# Patient Record
Sex: FEMALE | Race: WHITE | Employment: UNEMPLOYED | URBAN - METROPOLITAN AREA
[De-identification: names, ages, dates, MRNs, and addresses within clinical notes are randomized per-mention and may not be internally consistent; named-entity substitution may affect disease eponyms.]

---

## 2017-08-28 ENCOUNTER — HOSPITAL ENCOUNTER (EMERGENCY)
Age: 37
Discharge: HOME | End: 2017-08-28
Attending: EMERGENCY MEDICINE
Payer: SELF-PAY

## 2017-08-28 ENCOUNTER — APPOINTMENT (OUTPATIENT)
Dept: CT IMAGING | Age: 37
End: 2017-08-28
Attending: EMERGENCY MEDICINE
Payer: SELF-PAY

## 2017-08-28 ENCOUNTER — APPOINTMENT (OUTPATIENT)
Dept: GENERAL RADIOLOGY | Age: 37
End: 2017-08-28
Attending: EMERGENCY MEDICINE
Payer: SELF-PAY

## 2017-08-28 VITALS
BODY MASS INDEX: 23.32 KG/M2 | DIASTOLIC BLOOD PRESSURE: 61 MMHG | HEART RATE: 100 BPM | TEMPERATURE: 98.4 F | WEIGHT: 140 LBS | RESPIRATION RATE: 16 BRPM | HEIGHT: 65 IN | OXYGEN SATURATION: 98 % | SYSTOLIC BLOOD PRESSURE: 103 MMHG

## 2017-08-28 DIAGNOSIS — G44.89 HEADACHE SYNDROME: Primary | ICD-10-CM

## 2017-08-28 DIAGNOSIS — Z87.898 HISTORY OF FEVER: ICD-10-CM

## 2017-08-28 DIAGNOSIS — R82.71 BACTERIA IN URINE: ICD-10-CM

## 2017-08-28 LAB
ALBUMIN SERPL-MCNC: 3 G/DL (ref 3.4–5)
ALBUMIN/GLOB SERPL: 0.8 {RATIO} (ref 0.8–1.7)
ALP SERPL-CCNC: 276 U/L (ref 45–117)
ALT SERPL-CCNC: 130 U/L (ref 13–56)
ANION GAP SERPL CALC-SCNC: 8 MMOL/L (ref 3–18)
APPEARANCE UR: ABNORMAL
AST SERPL-CCNC: 122 U/L (ref 15–37)
BACTERIA URNS QL MICRO: ABNORMAL /HPF
BASOPHILS # BLD: 0.1 K/UL (ref 0–0.06)
BASOPHILS NFR BLD: 1 % (ref 0–2)
BILIRUB SERPL-MCNC: 0.4 MG/DL (ref 0.2–1)
BILIRUB UR QL: ABNORMAL
BUN SERPL-MCNC: 10 MG/DL (ref 7–18)
BUN/CREAT SERPL: 20 (ref 12–20)
C TRACH RRNA SPEC QL NAA+PROBE: NEGATIVE
CALCIUM SERPL-MCNC: 8 MG/DL (ref 8.5–10.1)
CHLORIDE SERPL-SCNC: 105 MMOL/L (ref 100–108)
CO2 SERPL-SCNC: 25 MMOL/L (ref 21–32)
COLOR UR: ABNORMAL
CREAT SERPL-MCNC: 0.5 MG/DL (ref 0.6–1.3)
DIFFERENTIAL METHOD BLD: ABNORMAL
EOSINOPHIL # BLD: 0.2 K/UL (ref 0–0.4)
EOSINOPHIL NFR BLD: 2 % (ref 0–5)
EPITH CASTS URNS QL MICRO: ABNORMAL /LPF (ref 0–5)
ERYTHROCYTE [DISTWIDTH] IN BLOOD BY AUTOMATED COUNT: 14.4 % (ref 11.6–14.5)
GLOBULIN SER CALC-MCNC: 3.8 G/DL (ref 2–4)
GLUCOSE SERPL-MCNC: 97 MG/DL (ref 74–99)
GLUCOSE UR STRIP.AUTO-MCNC: NEGATIVE MG/DL
HCG UR QL: NEGATIVE
HCT VFR BLD AUTO: 41.2 % (ref 35–45)
HGB BLD-MCNC: 13.9 G/DL (ref 12–16)
HGB UR QL STRIP: NEGATIVE
KETONES UR QL STRIP.AUTO: ABNORMAL MG/DL
LEUKOCYTE ESTERASE UR QL STRIP.AUTO: ABNORMAL
LIPASE SERPL-CCNC: 99 U/L (ref 73–393)
LYMPHOCYTES # BLD: 5.1 K/UL (ref 0.9–3.6)
LYMPHOCYTES NFR BLD: 52 % (ref 21–52)
MCH RBC QN AUTO: 26.4 PG (ref 24–34)
MCHC RBC AUTO-ENTMCNC: 33.7 G/DL (ref 31–37)
MCV RBC AUTO: 78.2 FL (ref 74–97)
MONOCYTES # BLD: 1 K/UL (ref 0.05–1.2)
MONOCYTES NFR BLD: 10 % (ref 3–10)
MUCOUS THREADS URNS QL MICRO: ABNORMAL /LPF
N GONORRHOEA RRNA SPEC QL NAA+PROBE: NEGATIVE
NEUTS SEG # BLD: 3.5 K/UL (ref 1.8–8)
NEUTS SEG NFR BLD: 35 % (ref 40–73)
NITRITE UR QL STRIP.AUTO: NEGATIVE
PH UR STRIP: 7.5 [PH] (ref 5–8)
PLATELET # BLD AUTO: 188 K/UL (ref 135–420)
PMV BLD AUTO: 8.9 FL (ref 9.2–11.8)
POTASSIUM SERPL-SCNC: 3.5 MMOL/L (ref 3.5–5.5)
PROT SERPL-MCNC: 6.8 G/DL (ref 6.4–8.2)
PROT UR STRIP-MCNC: 30 MG/DL
RBC # BLD AUTO: 5.27 M/UL (ref 4.2–5.3)
RBC #/AREA URNS HPF: ABNORMAL /HPF (ref 0–5)
RBC MORPH BLD: ABNORMAL
SERVICE CMNT-IMP: NORMAL
SODIUM SERPL-SCNC: 138 MMOL/L (ref 136–145)
SP GR UR REFRACTOMETRY: 1.02 (ref 1–1.03)
SPECIMEN SOURCE: NORMAL
UROBILINOGEN UR QL STRIP.AUTO: 1 EU/DL (ref 0.2–1)
WBC # BLD AUTO: 9.9 K/UL (ref 4.6–13.2)
WBC MORPH BLD: ABNORMAL
WBC URNS QL MICRO: ABNORMAL /HPF (ref 0–4)
WET PREP GENITAL: NORMAL

## 2017-08-28 PROCEDURE — 81025 URINE PREGNANCY TEST: CPT | Performed by: EMERGENCY MEDICINE

## 2017-08-28 PROCEDURE — 70450 CT HEAD/BRAIN W/O DYE: CPT

## 2017-08-28 PROCEDURE — 87210 SMEAR WET MOUNT SALINE/INK: CPT | Performed by: NURSE PRACTITIONER

## 2017-08-28 PROCEDURE — 85025 COMPLETE CBC W/AUTO DIFF WBC: CPT | Performed by: EMERGENCY MEDICINE

## 2017-08-28 PROCEDURE — 96374 THER/PROPH/DIAG INJ IV PUSH: CPT

## 2017-08-28 PROCEDURE — 96361 HYDRATE IV INFUSION ADD-ON: CPT

## 2017-08-28 PROCEDURE — 87491 CHLMYD TRACH DNA AMP PROBE: CPT | Performed by: NURSE PRACTITIONER

## 2017-08-28 PROCEDURE — 74011250636 HC RX REV CODE- 250/636: Performed by: NURSE PRACTITIONER

## 2017-08-28 PROCEDURE — 74011000250 HC RX REV CODE- 250: Performed by: EMERGENCY MEDICINE

## 2017-08-28 PROCEDURE — 71020 XR CHEST PA LAT: CPT

## 2017-08-28 PROCEDURE — 80053 COMPREHEN METABOLIC PANEL: CPT | Performed by: EMERGENCY MEDICINE

## 2017-08-28 PROCEDURE — 74011250636 HC RX REV CODE- 250/636: Performed by: EMERGENCY MEDICINE

## 2017-08-28 PROCEDURE — 81001 URINALYSIS AUTO W/SCOPE: CPT | Performed by: EMERGENCY MEDICINE

## 2017-08-28 PROCEDURE — 96375 TX/PRO/DX INJ NEW DRUG ADDON: CPT

## 2017-08-28 PROCEDURE — 99285 EMERGENCY DEPT VISIT HI MDM: CPT

## 2017-08-28 PROCEDURE — 83690 ASSAY OF LIPASE: CPT | Performed by: EMERGENCY MEDICINE

## 2017-08-28 RX ORDER — METOCLOPRAMIDE HYDROCHLORIDE 5 MG/ML
10 INJECTION INTRAMUSCULAR; INTRAVENOUS
Status: COMPLETED | OUTPATIENT
Start: 2017-08-28 | End: 2017-08-28

## 2017-08-28 RX ORDER — KETOROLAC TROMETHAMINE 30 MG/ML
30 INJECTION, SOLUTION INTRAMUSCULAR; INTRAVENOUS
Status: COMPLETED | OUTPATIENT
Start: 2017-08-28 | End: 2017-08-28

## 2017-08-28 RX ORDER — FAMOTIDINE 10 MG/ML
20 INJECTION INTRAVENOUS
Status: COMPLETED | OUTPATIENT
Start: 2017-08-28 | End: 2017-08-28

## 2017-08-28 RX ORDER — DIPHENHYDRAMINE HYDROCHLORIDE 50 MG/ML
25 INJECTION, SOLUTION INTRAMUSCULAR; INTRAVENOUS
Status: COMPLETED | OUTPATIENT
Start: 2017-08-28 | End: 2017-08-28

## 2017-08-28 RX ORDER — SULFAMETHOXAZOLE AND TRIMETHOPRIM 800; 160 MG/1; MG/1
1 TABLET ORAL 2 TIMES DAILY
Qty: 14 TAB | Refills: 0 | Status: SHIPPED | OUTPATIENT
Start: 2017-08-28 | End: 2021-05-28

## 2017-08-28 RX ORDER — IBUPROFEN 800 MG/1
800 TABLET ORAL
Qty: 20 TAB | Refills: 0 | Status: SHIPPED | OUTPATIENT
Start: 2017-08-28 | End: 2017-09-04

## 2017-08-28 RX ADMIN — KETOROLAC TROMETHAMINE 30 MG: 30 INJECTION, SOLUTION INTRAMUSCULAR at 13:07

## 2017-08-28 RX ADMIN — SODIUM CHLORIDE 1000 ML: 900 INJECTION, SOLUTION INTRAVENOUS at 13:03

## 2017-08-28 RX ADMIN — DIPHENHYDRAMINE HYDROCHLORIDE 25 MG: 50 INJECTION, SOLUTION INTRAMUSCULAR; INTRAVENOUS at 13:09

## 2017-08-28 RX ADMIN — METOCLOPRAMIDE 10 MG: 5 INJECTION, SOLUTION INTRAMUSCULAR; INTRAVENOUS at 13:08

## 2017-08-28 RX ADMIN — FAMOTIDINE 20 MG: 10 INJECTION, SOLUTION INTRAVENOUS at 13:04

## 2017-08-28 NOTE — ED PROVIDER NOTES
HPI Comments: Fatemeh Mills is as 39year old female who presents to the ED with a variety of symptoms, but the main underlying problem seems to be a fever. Pt states she has a 101 temp last evening, and has been running a fever on and off for the past two weeks. She has had a headache, and vaginal spotting as well. She is working with GI at [facility] and recently had an upper GI by Dr Faiza Bowers. Admits to a Hx of fibroids              Patient is a 39 y.o. female presenting with fever, abdominal pain, dizziness, and nausea. The history is provided by the patient. Fever    This is a new problem. Episode onset: Pt stats the symptoms began two weeks ago. Abdominal Pain    Associated symptoms include a fever and nausea. Dizziness   Associated symptoms include nausea. Nausea    Associated symptoms include a fever and abdominal pain. 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 No pertinent family history. Social History     Social History    Marital status: LEGALLY      Spouse name: N/A    Number of children: N/A    Years of education: N/A     Occupational History    Not on file. Social History Main Topics    Smoking status: Current Every Day Smoker     Packs/day: 1.00    Smokeless tobacco: Never Used    Alcohol use No    Drug use: No    Sexual activity: Not on file     Other Topics Concern    Not on file     Social History Narrative    No narrative on file         ALLERGIES: Review of patient's allergies indicates no known allergies. Review of Systems   Constitutional: Positive for fever. HENT: Negative. Eyes: Negative. Respiratory: Negative. Cardiovascular: Negative.     Gastrointestinal: Positive for abdominal pain and nausea. Endocrine: Negative. Genitourinary:        Vaginal spotting     Musculoskeletal: Negative. Skin: Negative. Allergic/Immunologic: Negative. Neurological: Positive for dizziness. Psychiatric/Behavioral: Negative. Vitals:    08/28/17 1154   BP: (!) 148/101   Pulse: 100   Resp: 16   Temp: 98.4 °F (36.9 °C)   SpO2: 99%   Weight: 63.5 kg (140 lb)   Height: 5' 5\" (1.651 m)            Physical Exam   Constitutional: She is oriented to person, place, and time. She appears well-developed and well-nourished. No distress. HENT:   Head: Normocephalic and atraumatic. Eyes: EOM are normal. Pupils are equal, round, and reactive to light. Neck: Normal range of motion. Neck supple. Cardiovascular: Normal rate, regular rhythm and normal heart sounds. Pulmonary/Chest: Effort normal and breath sounds normal. No respiratory distress. She has no wheezes. She has no rales. Abdominal: Soft. Bowel sounds are normal. There is no tenderness. There is no rebound and no guarding. Genitourinary:   Genitourinary Comments: See procedure     Musculoskeletal: Normal range of motion. Neurological: She is alert and oriented to person, place, and time. No cranial nerve deficit. She exhibits normal muscle tone. Coordination normal.   Skin: Skin is warm and dry. Psychiatric: She has a normal mood and affect. Nursing note and vitals reviewed. MDM  Number of Diagnoses or Management Options  Bacteria in urine:   Headache syndrome:   History of fever:   Diagnosis management comments: PROGRESS NOTE:  No root cause of fever found. Will place the Pt on short course of ABX for bacteria in urine. Will DC to home. Kathy Mejia NP  3:14 PM           Amount and/or Complexity of Data Reviewed  Clinical lab tests: ordered  Tests in the radiology section of CPT®: ordered and reviewed      ED Course       Pelvic Exam  Date/Time: 8/28/2017 1:20 PM  Performed by: Adolfo BORJAC.   Procedure duration (minutes): 2. Documented by: Niall DIAZ. Chaperone: Nathaniel Benites. Type of exam performed: bimanual and speculum. External genitalia appearance: normal.    Vaginal exam:  normal (No evidece of bleeding noted. ). Cervical exam:  normal and no cervical motion tenderness. Specimen(s) collected:  chlamydia, GC and vaginal culture. Bimanual exam:  normal.            Diagnosis:   1. Headache syndrome    2. History of fever    3. Bacteria in urine          Disposition:   Discharged to Home. Follow-up Information     Follow up With Details Comments [address] [facility] [address], [place] Call in the morning to [facility] follow up   [address] [facility]  [address] [facility]  [phone]            Patient's Medications   Start Taking    IBUPROFEN (MOTRIN) 800 MG TABLET    Take 1 Tab by mouth every eight (8) hours as needed for Pain for up to 7 days. TRIMETHOPRIM-SULFAMETHOXAZOLE (BACTRIM DS, SEPTRA DS) 160-800 MG PER TABLET    Take 1 Tab by mouth two (2) times a day.    Continue Taking    No medications on file   These Medications have changed    No medications on file   Stop Taking    No medications on file

## 2017-08-28 NOTE — DISCHARGE INSTRUCTIONS
CME Activation    Thank you for requesting access to CME. Please follow the instructions below to securely access and download your online medical record. CME allows you to send messages to your doctor, view your test results, renew your prescriptions, schedule appointments, and more. How Do I Sign Up? 1. In your internet browser, go to [email or web address]  2. Click on the First Time User? Click Here link in the Sign In box. You will be redirect to the New Member Sign Up page. 3. Enter your CME Access Code exactly as it appears below. You will not need to use this code after youve completed the sign-up process. If you do not sign up before the expiration date, you must request a new code. CME Access Code: [email or web address]  Expires: 2017  3:17 PM (This is the date your CME access code will )    4. Enter the last four digits of your Social Security Number (xxxx) and Date of Birth (mm/dd/yyyy) as indicated and click Submit. You will be taken to the next sign-up page. 5. Create a CME ID. This will be your CME login ID and cannot be changed, so think of one that is secure and easy to remember. 6. Create a CME password. You can change your password at any time. 7. Enter your Password Reset Question and Answer. This can be used at a later time if you forget your password. 8. Enter your e-mail address. You will receive e-mail notification when new information is available in [address] 9. Click Sign Up. You can now view and download portions of your medical record. 10. Click the Download Summary menu link to download a portable copy of your medical information. Additional Information    If you have questions, please visit the Frequently Asked Questions section of the CME website at [email or web address] Remember, CME is NOT to be used for urgent needs. For medical emergencies, dial 911. Keep well hydrated.   Drink 3 liters of water daily. Follow up with your physician or the referral as provided.

## 2017-08-28 NOTE — ED NOTES
Discharging patient for primary nurse [facility] RN. Discharge instructions given to patient, patient verbalizes understanding of instructions. Patient alert and oriented x3, denies pain or shortness of breath at this time. Patient ambulatory, gait steady. Patient armband removed and given to patient to take home. Patient was informed of the privacy risks if armband lost or stolen.

## 2021-05-28 ENCOUNTER — VIRTUAL VISIT (OUTPATIENT)
Dept: FAMILY MEDICINE CLINIC | Age: 41
End: 2021-05-28
Payer: MEDICAID

## 2021-05-28 DIAGNOSIS — F41.9 ANXIETY AND DEPRESSION: ICD-10-CM

## 2021-05-28 DIAGNOSIS — K22.9 ESOPHAGEAL ABNORMALITY: ICD-10-CM

## 2021-05-28 DIAGNOSIS — K51.80 OTHER ULCERATIVE COLITIS WITHOUT COMPLICATION (HCC): ICD-10-CM

## 2021-05-28 DIAGNOSIS — R29.898 WEAKNESS OF BOTH LEGS: Primary | ICD-10-CM

## 2021-05-28 DIAGNOSIS — Q61.5 MEDULLARY SPONGE KIDNEY: ICD-10-CM

## 2021-05-28 DIAGNOSIS — F32.A ANXIETY AND DEPRESSION: ICD-10-CM

## 2021-05-28 DIAGNOSIS — F17.200 SMOKING: ICD-10-CM

## 2021-05-28 PROBLEM — K51.90 ULCERATIVE COLITIS WITHOUT COMPLICATIONS (HCC): Status: ACTIVE | Noted: 2017-08-10

## 2021-05-28 PROBLEM — R87.610 ATYPICAL SQUAMOUS CELL OF UNDETERMINED SIGNIFICANCE OF CERVIX: Status: ACTIVE | Noted: 2017-08-24

## 2021-05-28 PROBLEM — N85.2 ENLARGED UTERUS: Status: ACTIVE | Noted: 2017-08-22

## 2021-05-28 PROCEDURE — 99204 OFFICE O/P NEW MOD 45 MIN: CPT | Performed by: NURSE PRACTITIONER

## 2021-05-28 RX ORDER — CITALOPRAM 40 MG/1
40 TABLET, FILM COATED ORAL DAILY
COMMUNITY
End: 2021-05-28 | Stop reason: SDUPTHER

## 2021-05-28 RX ORDER — CITALOPRAM 40 MG/1
40 TABLET, FILM COATED ORAL DAILY
Qty: 90 TABLET | Refills: 0 | Status: SHIPPED | OUTPATIENT
Start: 2021-05-28 | End: 2021-09-09 | Stop reason: SDUPTHER

## 2021-05-28 RX ORDER — OMEPRAZOLE 40 MG/1
40 CAPSULE, DELAYED RELEASE ORAL DAILY
Qty: 90 CAPSULE | Refills: 0 | Status: SHIPPED | OUTPATIENT
Start: 2021-05-28 | End: 2021-09-09 | Stop reason: SDUPTHER

## 2021-05-28 RX ORDER — ALPRAZOLAM 1 MG/1
TABLET ORAL
COMMUNITY
End: 2021-10-19

## 2021-05-28 NOTE — PROGRESS NOTES
Chief Complaint   Patient presents with    Establish Care    Headache     1. Have you been to the ER, urgent care clinic since your last visit? Hospitalized since your last visit? No    2. Have you seen or consulted any other health care providers outside of the [address] [place] [address] since your last visit? Include any pap smears or colon screening.  No     Health Maintenance Due   Topic Date Due    COVID-19 Vaccine (1) Never done    DTaP/Tdap/Td series (1 - Tdap) Never done    PAP AKA CERVICAL CYTOLOGY  Never done    Lipid Screen  Never done

## 2021-05-28 NOTE — PROGRESS NOTES
[address] [place] [address]               [phone]      Darlin Gillespie is a 38 y.o. female who was seen by synchronous (real-time) audio-video technology on 5/28/2021. Consent: Darlin Gillespie, who was seen by synchronous (real-time) audio-video technology, and/or her healthcare decision maker, is aware that this patient-initiated, Telehealth encounter on 5/28/2021 is a billable service, with coverage as determined by her insurance carrier. She is aware that she may receive a bill and has provided verbal consent to proceed: Yes. Assessment & Plan:   Diagnoses and all orders for this visit:    1. Weakness of both legs  Endorses a problem of sudden onset leg weakness where she feels like her legs will give out from underneath her accompanied by lower back pain, she has not had any actual falls  We will have her come into the office for further evaluation, she has an appointment for this Tuesday at 10 AM  2. Medullary sponge kidney  -     REFERRAL TO UROLOGY  Past history of kidney disease, referral to urology for further evaluation and management  3. Other ulcerative colitis without complication ([facility] [address]  -     REFERRAL TO GASTROENTEROLOGY  Past history of ulcerative colitis, referral to gastroenterology for further evaluation and management  4. Esophageal abnormality  -     omeprazole (PRILOSEC) 40 mg capsule; Take 1 Capsule by mouth daily. Refill provided  5. Smoking  Is a current smoker, states every time she tries to quit smoking she seems to have a flare of her colitis, encouraged her to follow through with a follow-up to gastroenterology to see if there is a way to manage the colitis while she stop smoking  6. Anxiety and depression  -     citalopram (CeleXA) 40 mg tablet; Take 1 Tablet by mouth daily.   Will restart her Celexa, cautioned on the potential side effect of suicidal ideations, she verbalized understanding    Follow-up and Dispositions    · Return in about 3 months (around 8/28/2021) for depression, VV, 15 min.              712  Subjective:     Health Maintenance Due   Topic Date Due    Hepatitis C Screening  Never done    Pneumococcal 0-64 years (1 of 2 - PPSV23) Never done    COVID-19 Vaccine (1) Never done    DTaP/Tdap/Td series (1 - Tdap) Never done    PAP AKA CERVICAL CYTOLOGY  Never done    Lipid Screen  Never done             Jimmy Overton is a 38 y.o. female who was seen for   Establish Care and Headache      Depression/anxiety  Last took celexa 2 years ago, was on xanax also  Diagnosed about 20 years ago  Endorses feeling stress, anxiety, overwhelming sensations worsended in the past 4-5 months  Restart celexa: precautions given    Leg weakness : come in Tuesday 10AM  Legs will go numb and feel like they are giving out and she will get a pain in her lower back  Onset: 1 year ago  The problem frequency has increased, 2-3 times a week  Has not fallen  Denies numbness in her legs at other times  The episode last 5-10 minutes  Denies trauma or injury to her back  States no problems in pelvic area,   Endorses urinary frequency, inability to completely empty bladder, has worsened in the past 2 years  PMH: enlarged uterus and abnormal PAP with no f/u      Ulcerative collitis  Couple of weeks ago she has noticed that when she has a bm she gets hot/cold/feels like she will passout/headaches/shaking, the stool is diarrhea with mucous  Denies blood or hemorrhoid pain  Has not been to a GI doctor in awhile, used to go to [facility]      Medullary sponge kidney  Diagnosed about 19 years ago  Has frequent kidney stones  She was told congenital    History of abnormal eophogeal motility  Endorse symptoms consisten with esophageal spasms  Not on PPI or H2  Denies getting sour taste in mouth, worsening of problem when lying down  Problem happens suddenly, happens 10-15 minutes  Tries tums and drinking milk  Endorses nausea without emesis    Smoking  every time she stops her ulcerative colitis flares up  She is trying to quit  Afraid to quit because she does not want the colitis to flare up  Advised to talk with GI first      Prior to Admission medications    Medication Sig Start Date End Date Taking? Authorizing Provider   ALPRAZolam (Xanax) 1 mg tablet Take  by mouth. Yes Provider, Historical   omeprazole (PRILOSEC) 40 mg capsule Take 1 Capsule by mouth daily. 5/28/21  Yes Patricia Hector NP   citalopram (CeleXA) 40 mg tablet Take 1 Tablet by mouth daily. 5/28/21  Yes Patricia Hector NP     No Known Allergies    Patient Active Problem List   Diagnosis Code    Atypical squamous cell of undetermined significance of cervix R87.610    Enlarged uterus N85.2    Medullary sponge kidney Q61.5    Smoking F17.200    Ulcerative colitis without complications ([facility] [address] I43.57    Esophageal abnormality K22.9     Past Surgical History:   Procedure Laterality Date    HX LITHOTRIPSY      HX TUBAL LIGATION      LA ABDOMEN SURGERY PROC UNLISTED       History reviewed. No pertinent family history. Social History     Tobacco Use    Smoking status: Current Every Day Smoker     Packs/day: 1.00    Smokeless tobacco: Never Used   Substance Use Topics    Alcohol use: No       ROS  As stated in HPI, otherwise all others negative. Objective: There were no vitals taken for this visit. General: alert, cooperative, no distress   Mental  status: normal mood, behavior, speech, dress, motor activity, and thought processes, able to follow commands   HENT: NCAT   Neck: no visualized mass   Resp: no respiratory distress   Neuro: no gross deficits   Skin: no discoloration or lesions of concern on visible areas   Psychiatric: normal affect, consistent with stated mood, no evidence of hallucinations     Additional exam findings: We discussed the expected course, resolution and complications of the diagnosis(es) in detail.   Medication risks, benefits, costs, interactions, and alternatives were discussed as indicated. I advised her to contact the office if her condition worsens, changes or fails to improve as anticipated. She expressed understanding with the diagnosis(es) and plan. Makayla Le is a 38 y.o. female who was evaluated by a video visit encounter for concerns as above. Patient identification was verified prior to start of the visit. A caregiver was present when appropriate. Due to this being a TeleHealth encounter (During RQTRQ-59 public health emergency), evaluation of the following organ systems was limited: Vitals/Constitutional/EENT/Resp/CV/GI//MS/Neuro/Skin/Heme-Lymph-Imm. Pursuant to the emergency declaration under the [facility] [address] waiver authority and the [employer] Act, this Virtual  Visit was conducted, with patient's (and/or legal guardian's) consent, to reduce the patient's risk of exposure to COVID-19 and provide necessary medical care. Services were provided through a video synchronous discussion virtually to substitute for in-person clinic visit. Patient and provider were located at their individual homes. An After Visit Summary was printed and given to the patient. All diagnosis have been discussed with the patient and all of the patient's questions have been answered. Follow-up and Dispositions    · Return in about 3 months (around 8/28/2021) for depression, VV, 15 min. Jennyfer Parham, [facility]   Miroslava Thomas

## 2021-06-01 ENCOUNTER — OFFICE VISIT (OUTPATIENT)
Dept: FAMILY MEDICINE CLINIC | Age: 41
End: 2021-06-01
Payer: MEDICAID

## 2021-06-01 VITALS
WEIGHT: 168.8 LBS | OXYGEN SATURATION: 98 % | HEART RATE: 74 BPM | SYSTOLIC BLOOD PRESSURE: 107 MMHG | BODY MASS INDEX: 28.09 KG/M2 | DIASTOLIC BLOOD PRESSURE: 71 MMHG | TEMPERATURE: 97.9 F

## 2021-06-01 DIAGNOSIS — R29.898 WEAKNESS OF BOTH LEGS: Primary | ICD-10-CM

## 2021-06-01 DIAGNOSIS — R10.12 LUQ PAIN: ICD-10-CM

## 2021-06-01 DIAGNOSIS — R87.610 ATYPICAL SQUAMOUS CELL OF UNDETERMINED SIGNIFICANCE OF CERVIX: ICD-10-CM

## 2021-06-01 PROBLEM — N85.2 ENLARGED UTERUS: Status: RESOLVED | Noted: 2017-08-22 | Resolved: 2021-06-01

## 2021-06-01 PROCEDURE — 99214 OFFICE O/P EST MOD 30 MIN: CPT | Performed by: NURSE PRACTITIONER

## 2021-06-01 NOTE — PROGRESS NOTES
Chief Complaint   Patient presents with    Follow-up    Back Pain    Spasms     1. Have you been to the ER, urgent care clinic since your last visit? Hospitalized since your last visit? No    2. Have you seen or consulted any other health care providers outside of the [address] [place] [address] since your last visit? Include any pap smears or colon screening.  No     Health Maintenance Due   Topic Date Due    Hepatitis C Screening  Never done    Pneumococcal 0-64 years (1 of 2 - PPSV23) Never done    COVID-19 Vaccine (1) Never done    DTaP/Tdap/Td series (1 - Tdap) Never done    PAP AKA CERVICAL CYTOLOGY  Never done    Lipid Screen  Never done

## 2021-06-01 NOTE — PROGRESS NOTES
[address] [place] [address]               [phone]      Artur Maharaj is a 38 y.o. female and presents with Follow-up, Back Pain, and Spasms       Assessment/Plan:    Diagnoses and all orders for this visit:    1. Weakness of both legs  -     REFERRAL TO NEUROLOGY  No significant findings on her physical exam to explain her leg weakness, states the leg weakness starts in addition to numbness prior to feeling any back pain, will refer to neurology for further evaluation  2. LUQ pain  Endorses a tenderness to the left upper quadrant of her abdomen, advised her to discuss this with the gastroenterology when she goes to see them, no significant findings on her physical exam  3. Atypical squamous cell of undetermined significance of cervix  -     REFERRAL TO GYNECOLOGY  Referral to gynecology to follow-up on her abnormal Pap smears      Follow-up and Dispositions    · Return if symptoms worsen or fail to improve. Subjective: Had VV on 5/28 she endorses problems with leg weakness:  Legs will go numb and feel like they are giving out and she will get a pain in her lower back  Onset: 1 year ago  The problem frequency has increased, 2-3 times a week  Has not fallen  Denies numbness in her legs at other times  The episode last 5-10 minutes  Denies trauma or injury to her back  States no problems in pelvic area,   Endorses urinary frequency, inability to completely empty bladder, has worsened in the past 2 years  PMH: enlarged uterus and abnormal PAP with no f/u     Visit today  Confirms the above information  Also endroses a pain in her RUQ, the problem is there if she is sitting only, feels like something is getting pinched, this started about 1 month ago, sitting straight makes it better, eating and drinking does not affect the pain, just body position, does not recall any trauma or injury in the area      ROS:     ROS  As stated in HPI, otherwise all others negative. The problem list was updated as a part of today's visit. Patient Active Problem List   Diagnosis Code    Atypical squamous cell of undetermined significance of cervix R87.610    Medullary sponge kidney Q61.5    Smoking F17.200    [facility] H15.50    Esophageal abnormality K22.9       The PSH, FH were reviewed. SH:  Social History     Tobacco Use    Smoking status: Current Every Day Smoker     Packs/day: 1.00    Smokeless tobacco: Never Used   Substance Use Topics    Alcohol use: No    Drug use: No       Medications/Allergies:  Current Outpatient Medications on File Prior to Visit   Medication Sig Dispense Refill    omeprazole (PRILOSEC) 40 mg capsule Take 1 Capsule by mouth daily. 90 Capsule 0    citalopram (CeleXA) 40 mg tablet Take 1 Tablet by mouth daily. 90 Tablet 0    ALPRAZolam (Xanax) 1 mg tablet Take  by mouth. (Patient not taking: Reported on 6/1/2021)       No current facility-administered medications on file prior to visit. No Known Allergies    Objective:  Visit Vitals  /71   Pulse 74   Temp 97.9 °F (36.6 °C) (Temporal)   Wt 168 lb 12.8 oz (76.6 kg)   SpO2 98%   BMI 28.09 kg/m²    Body mass index is 28.09 kg/m². Physical assessment  Physical Exam  Vitals and nursing note reviewed. Eyes:      Conjunctiva/sclera: Conjunctivae normal.      Pupils: Pupils are equal, round, and reactive to light. Neck:      Vascular: No JVD. Cardiovascular:      Rate and Rhythm: Normal rate and regular rhythm. Pulses:           Posterior tibial pulses are 2+ on the right side and 2+ on the left side. Heart sounds: Normal heart sounds. No murmur heard. No friction rub. No gallop. Pulmonary:      Effort: Pulmonary effort is normal.      Breath sounds: Normal breath sounds. Abdominal:      General: Bowel sounds are normal.      Palpations: Abdomen is soft. Tenderness: There is abdominal tenderness in the left upper quadrant. Musculoskeletal:         General: Normal range of motion. Cervical back: Normal range of motion. Feet:      Right foot:      Protective Sensation: 6 sites tested. 6 sites sensed. Skin integrity: Skin integrity normal.      Left foot:      Protective Sensation: 6 sites tested. 6 sites sensed. Skin integrity: Skin integrity normal.   Skin:     General: Skin is warm and dry. Neurological:      Mental Status: She is alert and oriented to person, place, and time. Motor: No weakness. Deep Tendon Reflexes:      Reflex Scores:       Patellar reflexes are 3+ on the right side and 3+ on the left side. Achilles reflexes are 3+ on the right side and 3+ on the left side.   Psychiatric:         Mood and Affect: Affect normal.         Cognition and Memory: Memory normal.         Judgment: Judgment normal.           Labwork and Ancillary Studies:    CBC w/Diff  Lab Results   Component Value Date/Time    WBC 9.9 08/28/2017 12:05 PM    HGB 13.9 08/28/2017 12:05 PM    PLATELET 067 50/57/2137 12:05 PM         Basic Metabolic Profile  Lab Results   Component Value Date/Time    Sodium 138 08/28/2017 12:05 PM    Potassium 3.5 08/28/2017 12:05 PM    Chloride 105 08/28/2017 12:05 PM    CO2 25 08/28/2017 12:05 PM    Anion gap 8 08/28/2017 12:05 PM    Glucose 97 08/28/2017 12:05 PM    BUN 10 08/28/2017 12:05 PM    Creatinine 0.50 (L) 08/28/2017 12:05 PM    BUN/Creatinine ratio 20 08/28/2017 12:05 PM    GFR est AA >60 08/28/2017 12:05 PM    GFR est non-AA >60 08/28/2017 12:05 PM    Calcium 8.0 (L) 08/28/2017 12:05 PM        Cholesterol  No results found for: CHOL, CHOLX, CHLST, CHOLV, HDL, HDLP, LDL, LDLC, DLDLP, TGLX, TRIGL, TRIGP, CHHD, CHHDX    Health Maintenance:   Health Maintenance   Topic Date Due    Hepatitis C Screening  Never done    Pneumococcal 0-64 years (1 of 2 - PPSV23) Never done    COVID-19 Vaccine (1) Never done    DTaP/Tdap/Td series (1 - Tdap) Never done    PAP AKA CERVICAL CYTOLOGY  Never done    Lipid Screen  Never done    Flu Vaccine (Season Ended) 09/01/2021       I have discussed the diagnosis with the patient and the intended plan as seen in the above orders. The patient has received an After-Visit Summary and questions were answered concerning future plans. An After Visit Summary was printed and given to the patient. All diagnosis have been discussed with the patient and all of the patient's questions have been answered. Follow-up and Dispositions    · Return if symptoms worsen or fail to improve. Atif Brink, WALKER-BC  [address] [facility]   [address] [facility] [address]  [address]

## 2021-09-09 DIAGNOSIS — F32.A ANXIETY AND DEPRESSION: ICD-10-CM

## 2021-09-09 DIAGNOSIS — K22.9 ESOPHAGEAL ABNORMALITY: ICD-10-CM

## 2021-09-09 DIAGNOSIS — F41.9 ANXIETY AND DEPRESSION: ICD-10-CM

## 2021-09-12 RX ORDER — OMEPRAZOLE 40 MG/1
40 CAPSULE, DELAYED RELEASE ORAL DAILY
Qty: 90 CAPSULE | Refills: 0 | Status: ON HOLD | OUTPATIENT
Start: 2021-09-12 | End: 2021-12-15

## 2021-09-12 RX ORDER — CITALOPRAM 40 MG/1
40 TABLET, FILM COATED ORAL DAILY
Qty: 90 TABLET | Refills: 0 | Status: SHIPPED | OUTPATIENT
Start: 2021-09-12 | End: 2021-11-18 | Stop reason: SDUPTHER

## 2021-10-19 ENCOUNTER — OFFICE VISIT (OUTPATIENT)
Dept: NEUROLOGY | Age: 41
End: 2021-10-19
Payer: MEDICAID

## 2021-10-19 VITALS
DIASTOLIC BLOOD PRESSURE: 68 MMHG | SYSTOLIC BLOOD PRESSURE: 102 MMHG | BODY MASS INDEX: 29.46 KG/M2 | HEIGHT: 65 IN | HEART RATE: 85 BPM | WEIGHT: 176.8 LBS | RESPIRATION RATE: 22 BRPM | OXYGEN SATURATION: 97 %

## 2021-10-19 DIAGNOSIS — G89.29 CHRONIC MIDLINE LOW BACK PAIN, UNSPECIFIED WHETHER SCIATICA PRESENT: Primary | ICD-10-CM

## 2021-10-19 DIAGNOSIS — M54.50 CHRONIC MIDLINE LOW BACK PAIN, UNSPECIFIED WHETHER SCIATICA PRESENT: Primary | ICD-10-CM

## 2021-10-19 DIAGNOSIS — R29.898 WEAKNESS OF BOTH LEGS: ICD-10-CM

## 2021-10-19 PROCEDURE — 99204 OFFICE O/P NEW MOD 45 MIN: CPT | Performed by: STUDENT IN AN ORGANIZED HEALTH CARE EDUCATION/TRAINING PROGRAM

## 2021-10-19 NOTE — PROGRESS NOTES
Lamont Hyde is a 38 y.o. female . presents for New Patient Viktor Distance, NP) and Extremity Weakness (BLE)   . A 38years old female patient here for evaluation of lower back pain and weakness of her lower extremities of about 8 months duration. She has intermittent weakness of her lower extremities, i.e. her legs giving out when she is walking. She has to sit out. Might be followed by severe/excruciating pain over her lower back which lasted for about 2 minutes. Pain is nonradiating. Has occasional cramp over her hip on the right side. Pain is unpredictable. No falls. No weakness of her upper extremities. She has urge incontinence; following with urology. No bowel incontinence. She has occasional hyper neck pain with occipital headache; intermittent. Nonradiating. Denied having any history of trauma. Used to work as a ; but not working over the past 1 and half years. Used to lift heavy objects while at work. Currently smokes 1 pack/day; trying to stop. Past history of ulcerative colitis. Also has psoriasis; mainly involving the skin. No previous diagnosis of arthritis or sacroiliitis. Has tingling and numbness in her legs. Review of Systems   Constitutional: Negative for chills, fever and weight loss. HENT: Positive for tinnitus. Negative for hearing loss. Eyes: Negative for blurred vision and double vision. Respiratory: Negative for cough and shortness of breath. Cardiovascular: Negative for chest pain and leg swelling. Gastrointestinal: Negative for nausea and vomiting. Genitourinary: Positive for frequency and urgency. Negative for dysuria. Musculoskeletal: Positive for back pain and neck pain. Skin: Negative for itching and rash. Neurological: Positive for dizziness (sometimes), tingling (in legs), sensory change, focal weakness (intermittent LE weakness) and headaches (occipital area). Negative for tremors.    Endo/Heme/Allergies: Does not bruise/bleed easily. Psychiatric/Behavioral: Positive for depression. Negative for suicidal ideas. The patient is nervous/anxious. 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 Social History     Socioeconomic History    Marital status:      Spouse name: Not on file    Number of children: Not on file    Years of education: Not on file    Highest education level: Not on file   Occupational History    Not on file   Tobacco Use    Smoking status: Current Every Day Smoker     Packs/day: 1.00    Smokeless tobacco: Never Used   Substance and Sexual Activity    Alcohol use: No    Drug use: No    Sexual activity: Not on file   Other Topics Concern    Not on file   Social History Narrative    Not on file     Social Determinants of Health     Financial Resource Strain:     Difficulty of Paying Living Expenses:    Food Insecurity:     Worried About Running Out of Food in the Last Year:     [address] in the Last Year:    Transportation Needs:     Lack of Transportation (Medical):      Lack of Transportation (Non-Medical):    Physical Activity:     Days of Exercise per Week:     Minutes of Exercise per Session:    Stress:     Feeling of Stress :    Social Connections:     Frequency of Communication with Friends and Family:     Frequency of Social Gatherings with Friends and Family:     Attends Gnosticism Services:     Active Member of Clubs or Organizations:     Attends Club or Organization Meetings:     Marital Status:    Intimate Partner Violence:     Fear of Current or Ex-Partner:     Emotionally Abused:     Physically Abused:     Sexually Abused:         No Known Allergies      Current Outpatient Medications Medication Sig Dispense Refill    solifenacin (VESICARE) 5 mg tablet Take 1 Tablet by mouth daily. 90 Tablet 3    citalopram (CeleXA) 40 mg tablet Take 1 Tablet by mouth daily. 90 Tablet 0    omeprazole (PRILOSEC) 40 mg capsule Take 1 Capsule by mouth daily. 90 Capsule 0    ALPRAZolam (Xanax) 1 mg tablet Take  by mouth. Physical Exam  Constitutional:       Appearance: Normal appearance. HENT:      Head: Normocephalic and atraumatic. Mouth/Throat:      Mouth: Mucous membranes are moist.      Pharynx: Oropharynx is clear. No oropharyngeal exudate. Eyes:      Extraocular Movements: Extraocular movements intact. Pupils: Pupils are equal, round, and reactive to light. Pulmonary:      Effort: Pulmonary effort is normal. No respiratory distress. Musculoskeletal:         General: Normal range of motion. Cervical back: Normal range of motion and neck supple. Right lower leg: No edema. Left lower leg: No edema. Comments: Negative straight leg raising test   Neurological:      Mental Status: She is alert. Comments: Mental status: Awake, alert, oriented x3, follows simple and complex commands, no neglect, no extinction to DSS or VSS. Speech and languge: fluent, coherent,  and comprehension intact  CN: VFF, EOMI, PERRLA, face sensation intact , no facial asymmetry noted, palate elevation symmetric bilat, SS+SCM 5/5 bilat, tongue midline  Motor: no pronator drift, tone normal throughout, strength 5/5 throughout  Sensory: intact to light touch and PP throughout  Coordination: FNF, HS accurate w/o dysmetria  DTR: 2+ over the upper extremities; 3+ over the lower extremities; plantar downgoing.   Gait: Normal.            Office Visit on 07/27/2021   Component Date Value Ref Range Status    Color (UA POC) 07/27/2021 Yellow   Final    Clarity (UA POC) 07/27/2021 Clear   Final    Glucose (UA POC) 07/27/2021 Negative  Negative Final    Bilirubin (UA POC) 07/27/2021 Negative Negative Final    Ketones (UA POC) 07/27/2021 Negative  Negative Final    Specific gravity (UA POC) 07/27/2021 1.025  1.001 - 1.035 Final    Blood (UA POC) 07/27/2021 1+  Negative Final    pH (UA POC) 07/27/2021 7.0  4.6 - 8.0 Final    Protein (UA POC) 07/27/2021 Negative  Negative Final    Urobilinogen (UA POC) 07/27/2021 1 mg/dL  0.2 - 1 Final    Nitrites (UA POC) 07/27/2021 Negative  Negative Final    Leukocyte esterase (UA POC) 07/27/2021 Negative  Negative Final    Urine Culture, Routine 07/27/2021    Final                    Value:Lactobacillus species  50,000-100,000 colony forming units per mL      Susceptibility not normally performed on this organism.  Specific Gravity 07/27/2021 1.024  1.005 - 1.030 Final    pH (UA) 07/27/2021 6.5  5.0 - 7.5 Final    Color 07/27/2021 Yellow  Yellow Final    Appearance 07/27/2021 Clear  Clear Final    Leukocyte Esterase 07/27/2021 Negative  Negative Final    Protein 07/27/2021 Trace  Negative/Trace Final    Glucose 07/27/2021 Negative  Negative Final    Ketone 07/27/2021 Trace* Negative Final    Blood 07/27/2021 1+* Negative Final    Bilirubin 07/27/2021 Negative  Negative Final    Urobilinogen 07/27/2021 1.0  0.2 - 1.0 mg/dL Final    Nitrites 07/27/2021 Negative  Negative Final    Microscopic Examination 07/27/2021 See additional order   Final    Microscopic was indicated and was performed.  WBC 07/27/2021 None seen  0 - 5 /hpf Final    RBC 07/27/2021 11-30* 0 - 2 /hpf Final    Epithelial cells 07/27/2021 0-10  0 - 10 /hpf Final    Casts 07/27/2021 None seen  None seen /lpf Final    Bacteria 07/27/2021 None seen  None seen/Few Final             ICD-10-CM ICD-9-CM    1. Chronic midline low back pain, unspecified whether sciatica present  M54.50 724.2 MRI LUMB SPINE WO CONT    G89.29 338.29    2.  Weakness of both legs  R29.898 729.89 MRI LUMB SPINE WO CONT       Patient has lower back pain which is intermittent and associated with intermittent weakness of her lower extremities. No previous history of trauma. Neuro exam at this time is normal.  Since pain has been going on for 8 months and associated with lower extremity weakness, will get MRI of the lumbosacral spine to rule out the possibility of spinal canal stenosis or nerve root compression. We will see her in the clinic in 3 months time but will call her with the results of the MRI.

## 2021-10-19 NOTE — Clinical Note
10/19/2021    Patient: Sophy Staton   YOB: 1980   Date of Visit: 10/19/2021     Naheed Naidu NP  [address] [facility] [address]  [facility] [id number]  [place], [place]  [place]ia     Dear IRLANDA Garner NP,      Thank you for referring Ms. Nathaly Nelson to [facility] for evaluation. My notes for this consultation are attached. If you have questions, please do not hesitate to call me. I look forward to following your patient along with you.       Sincerely,    Domi Guardado MD

## 2021-11-02 DIAGNOSIS — G89.29 CHRONIC MIDLINE LOW BACK PAIN, UNSPECIFIED WHETHER SCIATICA PRESENT: ICD-10-CM

## 2021-11-02 DIAGNOSIS — R29.898 WEAKNESS OF BOTH LEGS: ICD-10-CM

## 2021-11-02 DIAGNOSIS — M54.50 CHRONIC MIDLINE LOW BACK PAIN, UNSPECIFIED WHETHER SCIATICA PRESENT: ICD-10-CM

## 2021-11-09 ENCOUNTER — HOSPITAL ENCOUNTER (OUTPATIENT)
Age: 41
Discharge: HOME | End: 2021-11-09
Attending: STUDENT IN AN ORGANIZED HEALTH CARE EDUCATION/TRAINING PROGRAM
Payer: MEDICAID

## 2021-11-09 PROCEDURE — 72148 MRI LUMBAR SPINE W/O DYE: CPT

## 2021-11-18 ENCOUNTER — OFFICE VISIT (OUTPATIENT)
Dept: FAMILY MEDICINE CLINIC | Age: 41
End: 2021-11-18
Payer: MEDICAID

## 2021-11-18 VITALS
RESPIRATION RATE: 18 BRPM | BODY MASS INDEX: 29.49 KG/M2 | HEIGHT: 65 IN | SYSTOLIC BLOOD PRESSURE: 123 MMHG | WEIGHT: 177 LBS | OXYGEN SATURATION: 98 % | DIASTOLIC BLOOD PRESSURE: 69 MMHG | TEMPERATURE: 98.4 F | HEART RATE: 84 BPM

## 2021-11-18 DIAGNOSIS — Z11.59 NEED FOR HEPATITIS C SCREENING TEST: ICD-10-CM

## 2021-11-18 DIAGNOSIS — Z23 ENCOUNTER FOR IMMUNIZATION: ICD-10-CM

## 2021-11-18 DIAGNOSIS — F41.9 ANXIETY AND DEPRESSION: Primary | ICD-10-CM

## 2021-11-18 DIAGNOSIS — Z13.220 LIPID SCREENING: ICD-10-CM

## 2021-11-18 DIAGNOSIS — F32.A ANXIETY AND DEPRESSION: Primary | ICD-10-CM

## 2021-11-18 PROBLEM — K21.9 GASTROESOPHAGEAL REFLUX DISEASE: Status: ACTIVE | Noted: 2021-05-28

## 2021-11-18 PROCEDURE — 90686 IIV4 VACC NO PRSV 0.5 ML IM: CPT | Performed by: NURSE PRACTITIONER

## 2021-11-18 PROCEDURE — 99213 OFFICE O/P EST LOW 20 MIN: CPT | Performed by: NURSE PRACTITIONER

## 2021-11-18 RX ORDER — CITALOPRAM 40 MG/1
40 TABLET, FILM COATED ORAL DAILY
Qty: 90 TABLET | Refills: 1 | Status: SHIPPED | OUTPATIENT
Start: 2021-11-18

## 2021-11-18 NOTE — PROGRESS NOTES
[address] [place] [address]               [phone]      Gabo Miller is a 42 y.o. female and presents with Follow-up and Depression       Assessment/Plan:    Diagnoses and all orders for this visit:    1. Anxiety and depression  -     METABOLIC PANEL, COMPREHENSIVE; Future  -     citalopram (CeleXA) 40 mg tablet; Take 1 Tablet by mouth daily. Endorses medication compliance, states depression controlled with current medication regimen  Refill provided  Follow up labs today  2. Need for hepatitis C screening test  -     HEPATITIS C AB; Future  Health maintenance needs addressed   3. Lipid screening  -     LIPID PANEL; Future  Health maintenance needs addressed   4. Encounter for immunization  -     INFLUENZA VIRUS VAC QUAD,SPLIT,PRESV FREE SYRINGE IM  -     THER/PROPH/DIAG INJECTION, SUBCUT/IM  Health maintenance needs addressed       Follow-up and Dispositions    · Return in about 6 months (around 5/18/2022) for CPE, w/o gyn, depression, 30 min, office only. Health Maintenance:   Health Maintenance   Topic Date Due    Pneumococcal 0-64 years (1 of 2 - PPSV23) Never done    DTaP/Tdap/Td series (1 - Tdap) Never done    Cervical cancer screen  08/22/2020    Lipid Screen  11/18/2026    Hepatitis C Screening  Completed    Flu Vaccine  Completed    COVID-19 Vaccine  Completed        Subjective:    Depression Review:  Patient is seen for followup of depression. Treatment includes Celexa and no other therapies. Ongoing symptoms include none. She denies depressed mood, anhedonia, feelings of worthlessness/guilt, difficulty concentrating, hopelessness, recurrent thoughts of death and suicidal thoughts without plan. She experiences the following side effects from the treatment: none. ROS:     ROS  As stated in HPI, otherwise all others negative. The problem list was updated as a part of today's visit.   Patient Active Problem List   Diagnosis Code    Atypical squamous cell of undetermined significance of cervix R87.610    Medullary sponge kidney Q61.5    Smoking F17.200    Ulcerative colitis without complications ([facility]) V86.04    Gastroesophageal reflux disease K21.9    Anxiety and depression F41.9, F32. A       The PSH, FH were reviewed. SH:  Social History     Tobacco Use    Smoking status: Current Every Day Smoker     Packs/day: 1.00    Smokeless tobacco: Never Used   Substance Use Topics    Alcohol use: No    Drug use: No       Medications/Allergies:  Current Outpatient Medications on File Prior to Visit   Medication Sig Dispense Refill    solifenacin (VESICARE) 5 mg tablet Take 1 Tablet by mouth daily. 90 Tablet 3    omeprazole (PRILOSEC) 40 mg capsule Take 1 Capsule by mouth daily. 90 Capsule 0     No current facility-administered medications on file prior to visit. No Known Allergies    Objective:  Visit Vitals  /69 (BP 1 Location: Left arm, BP Patient Position: Sitting, BP Cuff Size: Adult)   Pulse 84   Temp 98.4 °F (36.9 °C) (Temporal)   Resp 18   Ht 5' 5\" (1.651 m)   Wt 177 lb (80.3 kg)   SpO2 98%   BMI 29.45 kg/m²    Body mass index is 29.45 kg/m². Physical assessment  Physical Exam  Vitals and nursing note reviewed. Eyes:      Conjunctiva/sclera: Conjunctivae normal.      Pupils: Pupils are equal, round, and reactive to light. Neck:      Vascular: No JVD. Cardiovascular:      Rate and Rhythm: Normal rate and regular rhythm. Heart sounds: Normal heart sounds. No murmur heard. No friction rub. No gallop. Pulmonary:      Effort: Pulmonary effort is normal.      Breath sounds: Normal breath sounds. Musculoskeletal:         General: Normal range of motion. Cervical back: Normal range of motion. Skin:     General: Skin is warm and dry. Neurological:      Mental Status: She is alert and oriented to person, place, and time.    Psychiatric:         Mood and Affect: Affect normal.         Cognition and Memory: Memory normal.         Judgment: Judgment normal.           Labwork and Ancillary Studies:    CBC w/Diff  Lab Results   Component Value Date/Time    WBC 9.9 08/28/2017 12:05 PM    HGB 13.9 08/28/2017 12:05 PM    PLATELET 826 80/31/1722 12:05 PM         Basic Metabolic Profile  Lab Results   Component Value Date/Time    Sodium 138 11/18/2021 02:46 PM    Potassium 4.6 11/18/2021 02:46 PM    Chloride 100 11/18/2021 02:46 PM    CO2 26 11/18/2021 02:46 PM    Anion gap 12.0 11/18/2021 02:46 PM    Glucose 93 11/18/2021 02:46 PM    BUN 11 11/18/2021 02:46 PM    Creatinine 0.6 11/18/2021 02:46 PM    BUN/Creatinine ratio 20 08/28/2017 12:05 PM    GFR est AA >60 08/28/2017 12:05 PM    GFR est non-AA >60 08/28/2017 12:05 PM    Calcium 9.4 11/18/2021 02:46 PM        Cholesterol  Lab Results   Component Value Date/Time    Cholesterol, total 157 11/18/2021 02:46 PM    HDL Cholesterol 37 (L) 11/18/2021 02:46 PM    LDL, calculated 103 (H) 11/18/2021 02:46 PM    Triglyceride 85 11/18/2021 02:46 PM           I have discussed the diagnosis with the patient and the intended plan as seen in the above orders. The patient has received an After-Visit Summary and questions were answered concerning future plans. An After Visit Summary was printed and given to the patient. All diagnosis have been discussed with the patient and all of the patient's questions have been answered. Follow-up and Dispositions    · Return in about 6 months (around 5/18/2022) for CPE, w/o gyn, depression, 30 min, office only. Angelia Billings, [facility]  [facility]   [address] [facility] [address]  [id number]

## 2021-11-18 NOTE — PROGRESS NOTES
Room 7    Did patient bring someone? No    Did the patient have DME equipment? No    Did you take your medication today? yes      1. Have you been to the ER, urgent care clinic since your last visit? Hospitalized since your last visit? No    2. Have you seen or consulted any other health care providers outside of the [address] [place] [address] since your last visit? Include any pap smears or colon screening. Yes       Health Maintenance Due   Topic Date Due    Hepatitis C Screening  Never done    Pneumococcal 0-64 years (1 of 2 - PPSV23) Never done    DTaP/Tdap/Td series (1 - Tdap) Never done    Lipid Screen  Never done    Cervical cancer screen  08/22/2020    Flu Vaccine (1) Never done     Patient was given VIS for review, consent was obtained and per orders of NP. Priyank Dwyer , injection of Influenza given by Juliet Ivory. Patient observed. No signs nor symptoms of any adverse reactions. Patient tolerated injection well.

## 2021-11-19 LAB
A-G RATIO,AGRAT: 1.8 RATIO (ref 1.1–2.6)
ALBUMIN SERPL-MCNC: 4.4 G/DL (ref 3.5–5)
ALP SERPL-CCNC: 116 U/L (ref 25–115)
ALT SERPL-CCNC: 11 U/L (ref 5–40)
ANION GAP SERPL CALC-SCNC: 12 MMOL/L (ref 3–15)
AST SERPL W P-5'-P-CCNC: 16 U/L (ref 10–37)
BILIRUB SERPL-MCNC: 0.2 MG/DL (ref 0.2–1.2)
BUN SERPL-MCNC: 11 MG/DL (ref 6–22)
CALCIUM SERPL-MCNC: 9.4 MG/DL (ref 8.4–10.5)
CHLORIDE SERPL-SCNC: 100 MMOL/L (ref 98–110)
CHOLEST SERPL-MCNC: 157 MG/DL (ref 110–200)
CO2 SERPL-SCNC: 26 MMOL/L (ref 20–32)
CREAT SERPL-MCNC: 0.6 MG/DL (ref 0.5–1.2)
GFRAA, 66117: >60
GFRNA, 66118: >60
GLOBULIN,GLOB: 2.5 G/DL (ref 2–4)
GLUCOSE SERPL-MCNC: 93 MG/DL (ref 70–99)
HCV AB SER IA-ACNC: NORMAL
HDLC SERPL-MCNC: 37 MG/DL
HDLC SERPL-MCNC: 4.2 MG/DL (ref 0–5)
LDL/HDL RATIO,LDHD: 2.8
LDLC SERPL CALC-MCNC: 103 MG/DL (ref 50–99)
NON-HDL CHOLESTEROL, 011976: 120 MG/DL
POTASSIUM SERPL-SCNC: 4.6 MMOL/L (ref 3.5–5.5)
PROT SERPL-MCNC: 6.9 G/DL (ref 6.4–8.3)
SODIUM SERPL-SCNC: 138 MMOL/L (ref 133–145)
TRIGL SERPL-MCNC: 85 MG/DL (ref 40–149)
VLDLC SERPL CALC-MCNC: 17 MG/DL (ref 8–30)

## 2021-12-18 NOTE — PROGRESS NOTES
Please let patient know that the MRI of the lumbar spine has shown multifocal degenerative changes with no significant spinal canal or neural foraminal narrowing.

## 2022-01-24 ENCOUNTER — VIRTUAL VISIT (OUTPATIENT)
Dept: NEUROLOGY | Age: 42
End: 2022-01-24
Payer: MEDICAID

## 2022-01-24 DIAGNOSIS — G89.29 CHRONIC MIDLINE LOW BACK PAIN, UNSPECIFIED WHETHER SCIATICA PRESENT: Primary | ICD-10-CM

## 2022-01-24 DIAGNOSIS — M54.50 CHRONIC MIDLINE LOW BACK PAIN, UNSPECIFIED WHETHER SCIATICA PRESENT: Primary | ICD-10-CM

## 2022-01-24 DIAGNOSIS — R29.898 WEAKNESS OF BOTH LEGS: ICD-10-CM

## 2022-01-24 PROCEDURE — 99214 OFFICE O/P EST MOD 30 MIN: CPT | Performed by: STUDENT IN AN ORGANIZED HEALTH CARE EDUCATION/TRAINING PROGRAM

## 2022-01-24 NOTE — PROGRESS NOTES
Rosa Cabello is a 42 y.o. female on virtual visit today for follow-up. 1. Have you been to the ER, urgent care clinic since your last visit? Hospitalized since your last visit? Yes Reason for visit: [facility] admission and d/c 12/15/2021    2. Have you seen or consulted any other health care providers outside of the [address] [place] [address] since your last visit? Include any pap smears or colon screening. No    Mobile number [phone] will be used for today's visit.

## 2022-01-24 NOTE — PROGRESS NOTES
Ky Palomares is a 42 y.o. female who was seen by synchronous (real-time) audio-video technology on 1/24/2022 for Follow-up        Assessment & Plan:   Diagnoses and all orders for this visit:    1. Chronic midline low back pain, unspecified whether sciatica present  -     REFERRAL TO PHYSICAL THERAPY    2. Weakness of both legs  -     REFERRAL TO PHYSICAL THERAPY    MRI of the lumbosacral spine has shown mild degenerative changes; mild neural foraminal stenosis significant spinal canal stenosis. We will refer her for physical therapy. She will follow up with her primary care provider for the reported heterogeneous marrow signal changes which though mostly benign, rarely could be from a marrow infiltrative process. We will get CBC and CMP. We will see her again in 4 months time. Subjective:   A 42years old female patient here for follow-up of lower back pain. Last seen in the clinic in October 2021. This is a video/video encounter. An MRI of the lumbosacral spine showed multilevel mild disc herniations, most notable at L3-S1, and multilevel mild to moderate facet arthropathy. No significant spinal canal  stenosis except for mild foraminal stenosis from L3-S1. The MRI was reported to show slightly heterogeneous low T1 marrow signal throughout the lumbar spine, nonspecific but most of the time benign; rarely from infiltrative processes. She continues to have the lower back pain. It is intermittent. Her legs feel weak and might sometimes give out. No problems with her balance. No falls. Some numbness and tingling over her lower extremities. No incontinence. From previous encounter:  A 38years old female patient here for evaluation of lower back pain and weakness of her lower extremities of about 8 months duration. She has intermittent weakness of her lower extremities, i.e. her legs giving out when she is walking. She has to sit out.   Might be followed by severe/excruciating pain over her lower back which lasted for about 2 minutes. Pain is nonradiating. Has occasional cramp over her hip on the right side. Pain is unpredictable. No falls. No weakness of her upper extremities. She has urge incontinence; following with urology. No bowel incontinence. She has occasional hyper neck pain with occipital headache; intermittent. Nonradiating. Denied having any history of trauma. Used to work as a ; but not working over the past 1 and half years. Used to lift heavy objects while at work. Currently smokes 1 pack/day; trying to stop. Past history of ulcerative colitis. Also has psoriasis; mainly involving the skin. No previous diagnosis of arthritis or sacroiliitis. Has tingling and numbness in her legs. Prior to Admission medications    Medication Sig Start Date End Date Taking? Authorizing Provider   pantoprazole (Protonix) 40 mg tablet Take 40 mg by mouth daily. Yes Provider, Historical   citalopram (CeleXA) 40 mg tablet Take 1 Tablet by mouth daily. 11/18/21  Yes Cyrus Salazars, NP   solifenacin (VESICARE) 5 mg tablet Take 1 Tablet by mouth daily. 9/13/21  Yes Hardy Torres NP         Review of Systems   Constitutional: Negative for chills, fever and weight loss. HENT: Negative for hearing loss and tinnitus. Eyes: Negative for blurred vision and double vision. Respiratory: Negative for cough and shortness of breath. Cardiovascular: Negative for chest pain and leg swelling. Gastrointestinal: Negative for nausea and vomiting. Genitourinary: Positive for frequency. Negative for dysuria and urgency. Musculoskeletal: Positive for back pain. Negative for neck pain. Skin: Negative for itching and rash. Neurological: Positive for dizziness (sometimes), tingling (in lEs), sensory change and weakness (l;egs might give out). Negative for tremors and headaches (rare). Endo/Heme/Allergies: Does not bruise/bleed easily.    Psychiatric/Behavioral: Positive for depression. Negative for suicidal ideas. The patient is nervous/anxious. Objective:     Patient-Reported Vitals 1/24/2022   Patient-Reported Weight 160lb        [INSTRUCTIONS:  \"[x]\" Indicates a positive item  \"[]\" Indicates a negative item  -- DELETE ALL ITEMS NOT EXAMINED]    Constitutional: [] Appears well-developed and well-nourished [x] No apparent distress      [] Abnormal -     Mental status: [x] Alert and awake  [x] Oriented    [x] Able to follow commands    [] Abnormal -     Eyes:   EOM    [x]  Normal    [] Abnormal -   Sclera  []  Normal    [] Abnormal -          Discharge []  None visible   [] Abnormal -     HENT: [x] Normocephalic, atraumatic  [] Abnormal -   [x] Mouth/Throat: Mucous membranes are moist    External Ears [] Normal  [] Abnormal -    Neck: [] No visualized mass [] Abnormal -     Pulmonary/Chest: [x] Respiratory effort normal   [] No visualized signs of difficulty breathing or respiratory distress        [] Abnormal -      Musculoskeletal:   [x] Normal gait with no signs of ataxia         [x] Normal range of motion of neck        [] Abnormal -     Neurological:        [x] No Facial Asymmetry (Cranial nerve 7 motor function) (limited exam due to video visit)          [x] No gaze palsy        [] Abnormal -      Mental status: Awake, alert, oriented to the day, month, and year; follows simple and complex commands.   Speech and languge: fluent, coherent, and comprehension intact  CN: EOMI,  no facial asymmetry noted, moves head from side to side with no difficulty, intact shoulder shrug, tongue is midline. Motor: no pronator drift, moves arms and legs symmetrically, normal gait.    Coordination: Intact rapid alternating movements.               Skin:        [] No significant exanthematous lesions or discoloration noted on facial skin         [] Abnormal -            Psychiatric:       [x] Normal Affect [] Abnormal -        [] No Hallucinations    Study Result    Narrative & Impression   EXAMINATION: MRI lumbar spine without contrast     INDICATION: Lower back pain, lower extremity weakness     COMPARISON: None     TECHNIQUE: Sagittal and axial multiecho MRI sequences of the lumbar spine  without contrast.     FINDINGS:     General: Vertebral body heights preserved. Mild L5-S1 disc space loss. No  significant listhesis. Lumbar lordosis maintained. Conus ends at level L1-L2. No  abnormal signal in the distal cord. No abnormal epidural collection identified. Somewhat heterogeneous and slightly diminished T1 marrow signal throughout the  lumbar spine, without any focal suspicious marrow lesions.     Miscellaneous: No incidental significant findings identified outside of the  lumbar spine region.     Levels:     T10-T11, T11-T12: Sagittal plane only. Minimal degenerative changes without  significant stenosis.     T12-L1: No significant degenerative change or stenosis.     L1-L2: Very minimal disc bulge. No significant stenosis. Facets unremarkable.     L2-L3: Very minimal disc bulge. Mild facet arthropathy. No significant stenosis.     L3-L4: Tiny left foraminal disc protrusion, with background minimal disc bulge. Mild facet arthropathy. Spinal canal patent. Minimal left foraminal narrowing.     L4-L5: Tiny left foraminal disc protrusion with background minimal disc bulge. Mild to moderate facet arthropathy. Minimal spinal canal narrowing with mild  abutment of the crossing L5 nerves, left more than right. Mild left foraminal  stenosis.     L5-S1: Tiny posterior central disc protrusion with possible subtle annular  fissure, and background mild disc bulge. Mild to moderate facet arthropathy. Extensive epidural lipomatosis markedly narrows the thecal sac.  Minimal  foraminal narrowing.     Imaged sacrum: Epidural lipomatosis otherwise unremarkable.     IMPRESSION     Multilevel mild disc herniations, most notable at L3-S1, and multilevel mild to  moderate facet arthropathy.  -No significant spinal canal stenosis. -L3-S1 mild foraminal stenoses. Sites of potential nerve root abutment described  above.     Slightly heterogeneous low T1 marrow signal throughout the lumbar spine,  nonspecific, usually benign and related to red marrow hyperplasia, less likely  due to marrow infiltrative processes, but clinical correlation advised. We discussed the expected course, resolution and complications of the diagnosis(es) in detail. Medication risks, benefits, costs, interactions, and alternatives were discussed as indicated. I advised her to contact the office if her condition worsens, changes or fails to improve as anticipated. She expressed understanding with the diagnosis(es) and plan. Ora Carlos, was evaluated through a synchronous (real-time) audio-video encounter. The patient (or guardian if applicable) is aware that this is a billable service, which includes applicable co-pays. Verbal consent to proceed has been obtained. The visit was conducted pursuant to the emergency declaration under the [facility], [address] [place] [address] authority and the [employer] Act. Patient identification was verified, and a caregiver was present when appropriate. The patient was located at home in a state where the provider was licensed to provide care.       Nils Neil MD

## 2022-01-25 DIAGNOSIS — R29.898 WEAKNESS OF BOTH LEGS: ICD-10-CM

## 2022-01-25 DIAGNOSIS — G89.29 CHRONIC MIDLINE LOW BACK PAIN, UNSPECIFIED WHETHER SCIATICA PRESENT: ICD-10-CM

## 2022-01-25 DIAGNOSIS — M54.50 CHRONIC MIDLINE LOW BACK PAIN, UNSPECIFIED WHETHER SCIATICA PRESENT: ICD-10-CM

## 2022-03-18 PROBLEM — Q61.5 MEDULLARY SPONGE KIDNEY: Status: ACTIVE | Noted: 2017-07-28

## 2022-03-18 PROBLEM — F32.A ANXIETY AND DEPRESSION: Status: ACTIVE | Noted: 2021-11-18

## 2022-03-18 PROBLEM — R87.610 ATYPICAL SQUAMOUS CELL OF UNDETERMINED SIGNIFICANCE OF CERVIX: Status: ACTIVE | Noted: 2017-08-24

## 2022-03-18 PROBLEM — F41.9 ANXIETY AND DEPRESSION: Status: ACTIVE | Noted: 2021-11-18

## 2022-03-19 PROBLEM — K21.9 GASTROESOPHAGEAL REFLUX DISEASE: Status: ACTIVE | Noted: 2021-05-28

## 2022-03-19 PROBLEM — F17.200 SMOKING: Status: ACTIVE | Noted: 2017-08-10

## 2022-03-19 PROBLEM — K51.90 ULCERATIVE COLITIS WITHOUT COMPLICATIONS (HCC): Status: ACTIVE | Noted: 2017-08-10

## 2023-03-20 NOTE — ED TRIAGE NOTES
Onset two week, endoscopy done 8/23, has appointment with GI this week, US scheduled tomorrow Lower abdominal pain
diffuse